# Patient Record
Sex: FEMALE | Race: WHITE | Employment: UNEMPLOYED | URBAN - NONMETROPOLITAN AREA
[De-identification: names, ages, dates, MRNs, and addresses within clinical notes are randomized per-mention and may not be internally consistent; named-entity substitution may affect disease eponyms.]

---

## 2017-06-05 ENCOUNTER — OFFICE VISIT (OUTPATIENT)
Dept: OPTOMETRY | Age: 27
End: 2017-06-05
Payer: COMMERCIAL

## 2017-06-05 DIAGNOSIS — H52.203 ASTIGMATISM, BILATERAL: Primary | ICD-10-CM

## 2017-06-05 PROCEDURE — 92014 COMPRE OPH EXAM EST PT 1/>: CPT | Performed by: OPTOMETRIST

## 2017-06-05 RX ORDER — BENOXINATE HCL/FLUORESCEIN SOD 0.4%-0.25%
1 DROPS OPHTHALMIC (EYE) ONCE
Status: COMPLETED | OUTPATIENT
Start: 2017-06-05 | End: 2017-06-05

## 2017-06-05 RX ADMIN — Medication 1 DROP: at 15:34

## 2017-06-05 ASSESSMENT — VISUAL ACUITY
METHOD: SNELLEN - LINEAR
OS_CC: 20/25
CORRECTION_TYPE: GLASSES

## 2017-06-05 ASSESSMENT — REFRACTION_WEARINGRX
OD_CYLINDER: +0.00
OD_SPHERE: -0.25
OS_CYLINDER: -1.00
SPECS_TYPE: SVL
OS_AXIS: 010
OS_SPHERE: +0.25
OD_AXIS: 180

## 2017-06-05 ASSESSMENT — REFRACTION_MANIFEST
OD_SPHERE: -0.25
OS_AXIS: 013
OD_AXIS: 020
OS_SPHERE: +0.25
OD_CYLINDER: -0.25
OS_CYLINDER: -1.25

## 2017-06-05 ASSESSMENT — TONOMETRY
OD_IOP_MMHG: 15
OS_IOP_MMHG: 15
IOP_METHOD: APPLANATION W FLURESS DROP

## 2017-06-05 ASSESSMENT — SLIT LAMP EXAM - LIDS
COMMENTS: NORMAL
COMMENTS: NORMAL

## 2018-01-22 ENCOUNTER — OFFICE VISIT (OUTPATIENT)
Dept: PRIMARY CARE CLINIC | Age: 28
End: 2018-01-22
Payer: MEDICARE

## 2018-01-22 VITALS
RESPIRATION RATE: 16 BRPM | TEMPERATURE: 97.8 F | WEIGHT: 105.6 LBS | OXYGEN SATURATION: 100 % | SYSTOLIC BLOOD PRESSURE: 98 MMHG | BODY MASS INDEX: 17.04 KG/M2 | DIASTOLIC BLOOD PRESSURE: 64 MMHG | HEART RATE: 91 BPM

## 2018-01-22 DIAGNOSIS — J04.0 LARYNGITIS: Primary | ICD-10-CM

## 2018-01-22 DIAGNOSIS — J40 BRONCHITIS: ICD-10-CM

## 2018-01-22 PROCEDURE — G8419 CALC BMI OUT NRM PARAM NOF/U: HCPCS | Performed by: NURSE PRACTITIONER

## 2018-01-22 PROCEDURE — G8427 DOCREV CUR MEDS BY ELIG CLIN: HCPCS | Performed by: NURSE PRACTITIONER

## 2018-01-22 PROCEDURE — G8484 FLU IMMUNIZE NO ADMIN: HCPCS | Performed by: NURSE PRACTITIONER

## 2018-01-22 PROCEDURE — 1036F TOBACCO NON-USER: CPT | Performed by: NURSE PRACTITIONER

## 2018-01-22 PROCEDURE — 99213 OFFICE O/P EST LOW 20 MIN: CPT | Performed by: NURSE PRACTITIONER

## 2018-01-22 RX ORDER — FLUTICASONE PROPIONATE 50 MCG
1 SPRAY, SUSPENSION (ML) NASAL DAILY
Qty: 1 BOTTLE | Refills: 0 | Status: SHIPPED | OUTPATIENT
Start: 2018-01-22

## 2018-01-22 RX ORDER — PREDNISONE 20 MG/1
20 TABLET ORAL 2 TIMES DAILY
Qty: 10 TABLET | Refills: 0 | Status: SHIPPED | OUTPATIENT
Start: 2018-01-22 | End: 2018-01-27

## 2018-01-22 ASSESSMENT — ENCOUNTER SYMPTOMS
SHORTNESS OF BREATH: 1
WHEEZING: 1
SPUTUM PRODUCTION: 1

## 2018-01-22 NOTE — PATIENT INSTRUCTIONS
get your voice back. Follow-up care is a key part of your treatment and safety. Be sure to make and go to all appointments, and call your doctor if you are having problems. It's also a good idea to know your test results and keep a list of the medicines you take. How can you care for yourself at home? · Follow your doctor's directions for treating the condition that caused you to lose your voice. If your doctor prescribed antibiotics, take them as directed. Do not stop taking them just because you feel better. You need to take the full course of antibiotics. · Before you use cough and cold medicines, check the label. They may not be safe for young children or for people with certain health problems. · Try to keep stomach acid from backing up into your throat. Do not eat just before bedtime. Reduce the amount of coffee and alcohol you drink, and eat healthy foods. Taking over-the-counter acid reducers can help when these steps are not enough. In some cases, you may need prescription medicine. · Rest your voice. You do not have to stop speaking, but use your voice as little as possible. Speak softly but do not whisper; whispering can bother your larynx more than speaking softly. Avoid talking on the telephone or trying to speak loudly. · Try not to clear your throat. This can cause more irritation of your larynx. Take an over-the-counter cough suppressant (if your doctor recommends it) if you have a dry cough that does not produce mucus. · Do not smoke or allow others to smoke around you. If you need help quitting, talk to your doctor about stop-smoking programs and medicines. These can increase your chances of quitting for good. · Use a humidifier or vaporizer to add moisture to your bedroom. Humidity helps to thin the mucus in the nasal membranes that causes stuffiness or postnasal drip. Follow the directions for cleaning the machine.   · Drink plenty of fluids, enough so that your urine is light yellow or

## 2018-01-22 NOTE — PROGRESS NOTES
Plan:      Patient has signs and symptoms of upper respiratory infection / viral illness. Patient is afebrile and stable. In my opinion patient can be treated with over the counter medications. Patient can use Tylenol and/or OTC cough syrup. Antibiotics are not indicated at the present time. Advised to follow up with family doctor or return to urgent care if does not get better or symptoms worsen. Pt can go to ER if high fever >102 , vomiting, breathing difficulty, lethargy. Patient/ parents understands this approach of home management and agrees with it. Use Prednisone, Flonase, and Neti Pot as directed. Allergies   Allergen Reactions    Pcn [Penicillins] Swelling     As infant     Current Outpatient Prescriptions   Medication Sig Dispense Refill    predniSONE (DELTASONE) 20 MG tablet Take 1 tablet by mouth 2 times daily for 5 days 10 tablet 0    fluticasone (FLONASE) 50 MCG/ACT nasal spray 1 spray by Nasal route daily 1 Bottle 0     No current facility-administered medications for this visit.